# Patient Record
Sex: FEMALE | Race: WHITE | NOT HISPANIC OR LATINO | Employment: FULL TIME | URBAN - NONMETROPOLITAN AREA
[De-identification: names, ages, dates, MRNs, and addresses within clinical notes are randomized per-mention and may not be internally consistent; named-entity substitution may affect disease eponyms.]

---

## 2023-04-25 ENCOUNTER — CLINICAL SUPPORT (OUTPATIENT)
Dept: PRIMARY CARE | Facility: CLINIC | Age: 23
End: 2023-04-25
Payer: COMMERCIAL

## 2023-04-25 DIAGNOSIS — Z23 ENCOUNTER FOR IMMUNIZATION: ICD-10-CM

## 2023-04-25 DIAGNOSIS — Z11.1 SCREENING FOR TUBERCULOSIS: ICD-10-CM

## 2023-04-25 PROBLEM — L91.0 KELOID CICATRIX: Status: ACTIVE | Noted: 2023-04-25

## 2023-04-25 PROBLEM — N63.0 BREAST MASS: Status: ACTIVE | Noted: 2023-04-25

## 2023-04-25 PROBLEM — N63.24 BREAST LUMP ON LEFT SIDE AT 7 O'CLOCK POSITION: Status: ACTIVE | Noted: 2023-04-25

## 2023-04-25 PROCEDURE — 86580 TB INTRADERMAL TEST: CPT | Performed by: FAMILY MEDICINE

## 2023-04-25 NOTE — PROGRESS NOTES
INJECTED 0.5 ML OF TUBERSOL INTRADERMALLY INTO LT FOREARM, WHEAL WAS ACHIEVED. OT INSTRUCTED TO COME BACK IN 48-72 HOURS TO HAVE RESULTS READ.

## 2023-04-27 ENCOUNTER — CLINICAL SUPPORT (OUTPATIENT)
Dept: PRIMARY CARE | Facility: CLINIC | Age: 23
End: 2023-04-27
Payer: COMMERCIAL

## 2023-04-27 DIAGNOSIS — Z11.1 SCREENING FOR TUBERCULOSIS: ICD-10-CM

## 2023-04-27 LAB
INDURATION: 0 MM
TB SKIN TEST: NEGATIVE

## 2023-06-12 ENCOUNTER — CLINICAL SUPPORT (OUTPATIENT)
Dept: PRIMARY CARE | Facility: CLINIC | Age: 23
End: 2023-06-12
Payer: COMMERCIAL

## 2023-06-12 DIAGNOSIS — Z11.1 VISIT FOR TB SKIN TEST: ICD-10-CM

## 2023-06-12 PROCEDURE — 86580 TB INTRADERMAL TEST: CPT | Performed by: FAMILY MEDICINE

## 2023-06-12 NOTE — PROGRESS NOTES
Pt given TB skin test 1 of 2 in Lt forearm. Pt to return either Wednesday after 2pm or Thursday before 2pm

## 2023-06-15 ENCOUNTER — CLINICAL SUPPORT (OUTPATIENT)
Dept: PRIMARY CARE | Facility: CLINIC | Age: 23
End: 2023-06-15
Payer: COMMERCIAL

## 2023-06-19 ENCOUNTER — CLINICAL SUPPORT (OUTPATIENT)
Dept: PRIMARY CARE | Facility: CLINIC | Age: 23
End: 2023-06-19
Payer: COMMERCIAL

## 2023-06-19 DIAGNOSIS — Z11.1 VISIT FOR TB SKIN TEST: ICD-10-CM

## 2023-06-19 PROCEDURE — 86580 TB INTRADERMAL TEST: CPT | Performed by: FAMILY MEDICINE

## 2023-06-21 ENCOUNTER — CLINICAL SUPPORT (OUTPATIENT)
Dept: PRIMARY CARE | Facility: CLINIC | Age: 23
End: 2023-06-21
Payer: COMMERCIAL

## 2023-06-21 DIAGNOSIS — Z11.1 VISIT FOR TB SKIN TEST: ICD-10-CM

## 2023-06-21 LAB
INDURATION: 0 MM
INDURATION: 0 MM
TB SKIN TEST: NEGATIVE
TB SKIN TEST: NEGATIVE

## 2023-06-21 NOTE — PROGRESS NOTES
PT CAME IN FOR TB READ  0 MM INDURATION PRESENT ON LT FOREARM. PATIENT WAS GIVEN A COPY OF DOCUMENT FOR INJECTIONS

## 2023-10-06 ENCOUNTER — OFFICE VISIT (OUTPATIENT)
Dept: PRIMARY CARE | Facility: CLINIC | Age: 23
End: 2023-10-06
Payer: COMMERCIAL

## 2023-10-06 VITALS
SYSTOLIC BLOOD PRESSURE: 112 MMHG | BODY MASS INDEX: 19.38 KG/M2 | DIASTOLIC BLOOD PRESSURE: 64 MMHG | HEIGHT: 67 IN | HEART RATE: 80 BPM | WEIGHT: 123.5 LBS | OXYGEN SATURATION: 98 %

## 2023-10-06 DIAGNOSIS — Z00.00 ROUTINE GENERAL MEDICAL EXAMINATION AT A HEALTH CARE FACILITY: Primary | ICD-10-CM

## 2023-10-06 PROCEDURE — 99395 PREV VISIT EST AGE 18-39: CPT | Performed by: FAMILY MEDICINE

## 2023-10-06 PROCEDURE — 1036F TOBACCO NON-USER: CPT | Performed by: FAMILY MEDICINE

## 2023-10-06 ASSESSMENT — ENCOUNTER SYMPTOMS
COUGH: 0
PALPITATIONS: 0
FATIGUE: 0
FREQUENCY: 0
ARTHRALGIAS: 0
CONSTIPATION: 0
NERVOUS/ANXIOUS: 0
HEADACHES: 0
MYALGIAS: 0
NAUSEA: 0
SHORTNESS OF BREATH: 0
SLEEP DISTURBANCE: 0
DYSURIA: 0
DIARRHEA: 0

## 2023-10-06 ASSESSMENT — PATIENT HEALTH QUESTIONNAIRE - PHQ9
2. FEELING DOWN, DEPRESSED OR HOPELESS: NOT AT ALL
1. LITTLE INTEREST OR PLEASURE IN DOING THINGS: NOT AT ALL
SUM OF ALL RESPONSES TO PHQ9 QUESTIONS 1 AND 2: 0

## 2023-10-06 NOTE — PROGRESS NOTES
"Subjective   Patient ID: Mague Rodriguez is a 23 y.o. female who presents for Annual Exam.    HPI   Since last year she has been feeling great, no problems.  Labs last year were excellent including an HDL and LDL that were both 71.  She did have troubles with a lump in her breast.  Dr. Beach will follow that.  Last ultrasound was done in March 2022 and was normal.  Unless there are any symptoms no follow-up needed.  She has not had any in either breast or pain in either breast.    From done.  No need for labs this year.    Review of Systems   Constitutional:  Negative for fatigue.   HENT:  Negative for hearing loss.    Eyes:  Negative for visual disturbance.   Respiratory:  Negative for cough and shortness of breath.    Cardiovascular:  Negative for chest pain and palpitations.   Gastrointestinal:  Negative for constipation, diarrhea and nausea.   Genitourinary:  Negative for dysuria and frequency.   Musculoskeletal:  Negative for arthralgias and myalgias.   Skin:  Negative for rash.   Neurological:  Negative for headaches.   Psychiatric/Behavioral:  Negative for sleep disturbance. The patient is not nervous/anxious.        Objective   /64   Pulse 80   Ht 1.702 m (5' 7\")   Wt 56 kg (123 lb 8 oz)   LMP 10/05/2023   SpO2 98%   BMI 19.34 kg/m²     Physical Exam  Constitutional:       Appearance: Normal appearance.   HENT:      Head: Normocephalic and atraumatic.   Cardiovascular:      Rate and Rhythm: Normal rate and regular rhythm.      Heart sounds: Normal heart sounds.   Pulmonary:      Effort: Pulmonary effort is normal.      Breath sounds: Normal breath sounds.   Skin:     General: Skin is warm and dry.   Neurological:      General: No focal deficit present.      Mental Status: She is alert and oriented to person, place, and time.   Psychiatric:         Mood and Affect: Mood normal.         Behavior: Behavior normal.         Thought Content: Thought content normal.         Judgment: Judgment normal. "         Assessment/Plan   Problem List Items Addressed This Visit    None  Visit Diagnoses         Codes    Routine general medical examination at a health care facility    -  Primary Z00.00

## 2024-05-06 ENCOUNTER — HOSPITAL ENCOUNTER (OUTPATIENT)
Dept: RADIOLOGY | Facility: CLINIC | Age: 24
Discharge: HOME | End: 2024-05-06
Payer: COMMERCIAL

## 2024-05-06 ENCOUNTER — OFFICE VISIT (OUTPATIENT)
Dept: URGENT CARE | Facility: CLINIC | Age: 24
End: 2024-05-06
Payer: COMMERCIAL

## 2024-05-06 VITALS
BODY MASS INDEX: 18.05 KG/M2 | DIASTOLIC BLOOD PRESSURE: 78 MMHG | SYSTOLIC BLOOD PRESSURE: 109 MMHG | WEIGHT: 115 LBS | HEIGHT: 67 IN | OXYGEN SATURATION: 100 % | RESPIRATION RATE: 18 BRPM | HEART RATE: 67 BPM

## 2024-05-06 DIAGNOSIS — S99.921A INJURY OF RIGHT FOOT, INITIAL ENCOUNTER: Primary | ICD-10-CM

## 2024-05-06 DIAGNOSIS — S99.921A INJURY OF RIGHT FOOT, INITIAL ENCOUNTER: ICD-10-CM

## 2024-05-06 PROCEDURE — 73630 X-RAY EXAM OF FOOT: CPT | Mod: RIGHT SIDE | Performed by: RADIOLOGY

## 2024-05-06 PROCEDURE — 73630 X-RAY EXAM OF FOOT: CPT | Mod: RT

## 2024-05-06 PROCEDURE — 99214 OFFICE O/P EST MOD 30 MIN: CPT
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
## 2025-02-03 ENCOUNTER — APPOINTMENT (OUTPATIENT)
Age: 25
End: 2025-02-03
Payer: COMMERCIAL

## 2025-02-03 VITALS
HEART RATE: 106 BPM | SYSTOLIC BLOOD PRESSURE: 98 MMHG | DIASTOLIC BLOOD PRESSURE: 62 MMHG | BODY MASS INDEX: 17.39 KG/M2 | OXYGEN SATURATION: 98 % | WEIGHT: 110.8 LBS | HEIGHT: 67 IN

## 2025-02-03 DIAGNOSIS — F41.1 GENERALIZED ANXIETY DISORDER: ICD-10-CM

## 2025-02-03 DIAGNOSIS — G44.89 OTHER HEADACHE SYNDROME: ICD-10-CM

## 2025-02-03 DIAGNOSIS — R53.83 FATIGUE, UNSPECIFIED TYPE: Primary | ICD-10-CM

## 2025-02-03 PROBLEM — L91.0 KELOID CICATRIX: Status: RESOLVED | Noted: 2023-04-25 | Resolved: 2025-02-03

## 2025-02-03 PROBLEM — N63.0 BREAST MASS: Status: RESOLVED | Noted: 2023-04-25 | Resolved: 2025-02-03

## 2025-02-03 PROBLEM — N63.24 BREAST LUMP ON LEFT SIDE AT 7 O'CLOCK POSITION: Status: RESOLVED | Noted: 2023-04-25 | Resolved: 2025-02-03

## 2025-02-03 PROCEDURE — 1036F TOBACCO NON-USER: CPT | Performed by: FAMILY MEDICINE

## 2025-02-03 PROCEDURE — 3008F BODY MASS INDEX DOCD: CPT | Performed by: FAMILY MEDICINE

## 2025-02-03 PROCEDURE — 99214 OFFICE O/P EST MOD 30 MIN: CPT | Performed by: FAMILY MEDICINE

## 2025-02-03 RX ORDER — FLUOXETINE 10 MG/1
10 CAPSULE ORAL DAILY
Qty: 90 CAPSULE | Refills: 3 | Status: SHIPPED | OUTPATIENT
Start: 2025-02-03 | End: 2026-02-03

## 2025-02-03 RX ORDER — NAPROXEN 250 MG/1
250 TABLET ORAL AS NEEDED
COMMUNITY

## 2025-02-03 RX ORDER — MELATONIN 3 MG
3 CAPSULE ORAL NIGHTLY PRN
COMMUNITY

## 2025-02-03 ASSESSMENT — ENCOUNTER SYMPTOMS
COUGH: 0
MYALGIAS: 0
SLEEP DISTURBANCE: 0
FATIGUE: 1
POLYDIPSIA: 0
HEADACHES: 1
NAUSEA: 0
DIARRHEA: 0
LIGHT-HEADEDNESS: 1
SHORTNESS OF BREATH: 0
NUMBNESS: 0
ARTHRALGIAS: 0
DIZZINESS: 0
PALPITATIONS: 0
CONSTIPATION: 0
NERVOUS/ANXIOUS: 1

## 2025-02-03 ASSESSMENT — PATIENT HEALTH QUESTIONNAIRE - PHQ9
SUM OF ALL RESPONSES TO PHQ9 QUESTIONS 1 & 2: 0
2. FEELING DOWN, DEPRESSED OR HOPELESS: NOT AT ALL
1. LITTLE INTEREST OR PLEASURE IN DOING THINGS: NOT AT ALL

## 2025-02-03 NOTE — PROGRESS NOTES
Subjective   Patient ID: Mague Rodriguez is a 24 y.o. female who presents for Follow-up (Headache behind eyes, daily x every day).    HPI   Getting frontal headaches over the sinus area off-and-on for the last 1 to 2 months.  No nausea photophobia or phonophobia.  Feels like a stabbing pain.  Sometimes she will feel like she has a vice or bandlike sensation around her head.  No throbbing or pounding.  No aura.  No past medical history or family history of migraines.  Naprosyn does help, Excedrin to help with the caffeine interfered with her sleep greatly.  She has not been taking the Naprosyn every day but she has been most of the days of the week.    Little bit of heat intolerance some anxiety sleep issues were off and on sometimes she sleeps too much sometimes not enough.  But no trouble falling asleep no racing thoughts.    Definitely has been having more anxiety over the last 3 months.  Had in the past but never this bad.  Periods have been okay.  For anxiety in the past was on fluoxetine and trazodone.  Fluoxetine helped without any problems did not like the trazodone.    Stop melatonin and Naprosyn  Basic labs plus magnesium and B12  Restart fluoxetine 10 mg daily.  Effexor can be back up.  Office visit 3 months    Review of Systems   Constitutional:  Positive for fatigue.   HENT:  Negative for hearing loss.    Eyes:  Negative for visual disturbance.   Respiratory:  Negative for cough and shortness of breath.    Cardiovascular:  Negative for chest pain and palpitations.   Gastrointestinal:  Negative for constipation, diarrhea and nausea.   Endocrine: Positive for heat intolerance. Negative for cold intolerance and polydipsia.   Musculoskeletal:  Negative for arthralgias and myalgias.   Skin:  Negative for rash.   Neurological:  Positive for light-headedness and headaches. Negative for dizziness and numbness.   Psychiatric/Behavioral:  Negative for sleep disturbance. The patient is nervous/anxious.   "      Objective   BP 98/62   Pulse 106   Ht 1.702 m (5' 7\")   Wt 50.3 kg (110 lb 12.8 oz)   LMP 01/06/2025   SpO2 98%   BMI 17.35 kg/m²     Physical Exam  Constitutional:       Appearance: Normal appearance.   HENT:      Head: Normocephalic and atraumatic.   Neck:      Thyroid: No thyroid mass, thyromegaly or thyroid tenderness.   Cardiovascular:      Rate and Rhythm: Normal rate and regular rhythm.      Heart sounds: Normal heart sounds.   Pulmonary:      Effort: Pulmonary effort is normal.      Breath sounds: Normal breath sounds.   Lymphadenopathy:      Cervical: No cervical adenopathy.   Skin:     General: Skin is warm and dry.   Neurological:      General: No focal deficit present.      Mental Status: She is alert and oriented to person, place, and time.   Psychiatric:         Mood and Affect: Mood normal.         Behavior: Behavior normal.         Thought Content: Thought content normal.         Judgment: Judgment normal.         Assessment/Plan   Problem List Items Addressed This Visit             ICD-10-CM    Other headache syndrome G44.89    Relevant Orders    CBC    Comprehensive Metabolic Panel    Magnesium    Vitamin B12    Thyroid Stimulating Hormone     Other Visit Diagnoses         Codes    Fatigue, unspecified type    -  Primary R53.83    Relevant Orders    CBC    Comprehensive Metabolic Panel    Magnesium    Vitamin B12    Thyroid Stimulating Hormone    Generalized anxiety disorder     F41.1    Relevant Medications    FLUoxetine (PROzac) 10 mg capsule               "

## 2025-02-04 LAB
ALBUMIN SERPL-MCNC: 5 G/DL (ref 3.6–5.1)
ALP SERPL-CCNC: 49 U/L (ref 31–125)
ALT SERPL-CCNC: 12 U/L (ref 6–29)
ANION GAP SERPL CALCULATED.4IONS-SCNC: 7 MMOL/L (CALC) (ref 7–17)
AST SERPL-CCNC: 18 U/L (ref 10–30)
BILIRUB SERPL-MCNC: 0.6 MG/DL (ref 0.2–1.2)
BUN SERPL-MCNC: 14 MG/DL (ref 7–25)
CALCIUM SERPL-MCNC: 9.4 MG/DL (ref 8.6–10.2)
CHLORIDE SERPL-SCNC: 106 MMOL/L (ref 98–110)
CO2 SERPL-SCNC: 27 MMOL/L (ref 20–32)
CREAT SERPL-MCNC: 0.75 MG/DL (ref 0.5–0.96)
EGFRCR SERPLBLD CKD-EPI 2021: 114 ML/MIN/1.73M2
ERYTHROCYTE [DISTWIDTH] IN BLOOD BY AUTOMATED COUNT: 11.8 % (ref 11–15)
GLUCOSE SERPL-MCNC: 87 MG/DL (ref 65–99)
HCT VFR BLD AUTO: 43.2 % (ref 35–45)
HGB BLD-MCNC: 13.9 G/DL (ref 11.7–15.5)
MAGNESIUM SERPL-MCNC: 2.2 MG/DL (ref 1.5–2.5)
MCH RBC QN AUTO: 28.5 PG (ref 27–33)
MCHC RBC AUTO-ENTMCNC: 32.2 G/DL (ref 32–36)
MCV RBC AUTO: 88.7 FL (ref 80–100)
PLATELET # BLD AUTO: 324 THOUSAND/UL (ref 140–400)
PMV BLD REES-ECKER: 9.5 FL (ref 7.5–12.5)
POTASSIUM SERPL-SCNC: 4.2 MMOL/L (ref 3.5–5.3)
PROT SERPL-MCNC: 7.5 G/DL (ref 6.1–8.1)
RBC # BLD AUTO: 4.87 MILLION/UL (ref 3.8–5.1)
SODIUM SERPL-SCNC: 140 MMOL/L (ref 135–146)
TSH SERPL-ACNC: 1.86 MIU/L
VIT B12 SERPL-MCNC: 258 PG/ML (ref 200–1100)
WBC # BLD AUTO: 6.8 THOUSAND/UL (ref 3.8–10.8)

## 2025-02-10 ENCOUNTER — TELEPHONE (OUTPATIENT)
Dept: OBSTETRICS AND GYNECOLOGY | Facility: CLINIC | Age: 25
End: 2025-02-10
Payer: COMMERCIAL

## 2025-02-10 ENCOUNTER — APPOINTMENT (OUTPATIENT)
Dept: RADIOLOGY | Facility: HOSPITAL | Age: 25
End: 2025-02-10
Payer: COMMERCIAL

## 2025-02-10 ENCOUNTER — HOSPITAL ENCOUNTER (EMERGENCY)
Facility: HOSPITAL | Age: 25
Discharge: HOME | End: 2025-02-10
Attending: EMERGENCY MEDICINE
Payer: COMMERCIAL

## 2025-02-10 VITALS
SYSTOLIC BLOOD PRESSURE: 102 MMHG | OXYGEN SATURATION: 97 % | BODY MASS INDEX: 17.27 KG/M2 | RESPIRATION RATE: 16 BRPM | WEIGHT: 110 LBS | HEIGHT: 67 IN | DIASTOLIC BLOOD PRESSURE: 77 MMHG | TEMPERATURE: 98 F | HEART RATE: 77 BPM

## 2025-02-10 DIAGNOSIS — N83.202 CYST OF LEFT OVARY: Primary | ICD-10-CM

## 2025-02-10 DIAGNOSIS — N83.202 LEFT OVARIAN CYST: Primary | ICD-10-CM

## 2025-02-10 LAB
AMORPH CRY #/AREA UR COMP ASSIST: ABNORMAL /HPF
ANION GAP SERPL CALC-SCNC: 11 MMOL/L (ref 10–20)
APPEARANCE UR: ABNORMAL
B-HCG SERPL-ACNC: <2 MIU/ML
BASOPHILS # BLD AUTO: 0.06 X10*3/UL (ref 0–0.1)
BASOPHILS NFR BLD AUTO: 0.4 %
BILIRUB UR STRIP.AUTO-MCNC: NEGATIVE MG/DL
BUN SERPL-MCNC: 12 MG/DL (ref 6–23)
CALCIUM SERPL-MCNC: 8.9 MG/DL (ref 8.6–10.3)
CAOX CRY #/AREA UR COMP ASSIST: ABNORMAL /HPF
CHLORIDE SERPL-SCNC: 106 MMOL/L (ref 98–107)
CO2 SERPL-SCNC: 24 MMOL/L (ref 21–32)
COLOR UR: YELLOW
CREAT SERPL-MCNC: 0.85 MG/DL (ref 0.5–1.05)
EGFRCR SERPLBLD CKD-EPI 2021: >90 ML/MIN/1.73M*2
EOSINOPHIL # BLD AUTO: 0.16 X10*3/UL (ref 0–0.7)
EOSINOPHIL NFR BLD AUTO: 1.1 %
ERYTHROCYTE [DISTWIDTH] IN BLOOD BY AUTOMATED COUNT: 11.9 % (ref 11.5–14.5)
GLUCOSE SERPL-MCNC: 101 MG/DL (ref 74–99)
GLUCOSE UR STRIP.AUTO-MCNC: NORMAL MG/DL
HCT VFR BLD AUTO: 39.6 % (ref 36–46)
HGB BLD-MCNC: 13.4 G/DL (ref 12–16)
IMM GRANULOCYTES # BLD AUTO: 0.03 X10*3/UL (ref 0–0.7)
IMM GRANULOCYTES NFR BLD AUTO: 0.2 % (ref 0–0.9)
KETONES UR STRIP.AUTO-MCNC: NEGATIVE MG/DL
LEUKOCYTE ESTERASE UR QL STRIP.AUTO: NEGATIVE
LYMPHOCYTES # BLD AUTO: 1.69 X10*3/UL (ref 1.2–4.8)
LYMPHOCYTES NFR BLD AUTO: 11.9 %
MCH RBC QN AUTO: 29.3 PG (ref 26–34)
MCHC RBC AUTO-ENTMCNC: 33.8 G/DL (ref 32–36)
MCV RBC AUTO: 87 FL (ref 80–100)
MONOCYTES # BLD AUTO: 0.72 X10*3/UL (ref 0.1–1)
MONOCYTES NFR BLD AUTO: 5.1 %
MUCOUS THREADS #/AREA URNS AUTO: ABNORMAL /LPF
NEUTROPHILS # BLD AUTO: 11.54 X10*3/UL (ref 1.2–7.7)
NEUTROPHILS NFR BLD AUTO: 81.3 %
NITRITE UR QL STRIP.AUTO: NEGATIVE
NRBC BLD-RTO: 0 /100 WBCS (ref 0–0)
PH UR STRIP.AUTO: 5.5 [PH]
PLATELET # BLD AUTO: 277 X10*3/UL (ref 150–450)
POTASSIUM SERPL-SCNC: 3.6 MMOL/L (ref 3.5–5.3)
PROT UR STRIP.AUTO-MCNC: ABNORMAL MG/DL
RBC # BLD AUTO: 4.57 X10*6/UL (ref 4–5.2)
RBC # UR STRIP.AUTO: NEGATIVE MG/DL
RBC #/AREA URNS AUTO: ABNORMAL /HPF
SODIUM SERPL-SCNC: 137 MMOL/L (ref 136–145)
SP GR UR STRIP.AUTO: 1.02
SQUAMOUS #/AREA URNS AUTO: ABNORMAL /HPF
UROBILINOGEN UR STRIP.AUTO-MCNC: ABNORMAL MG/DL
WBC # BLD AUTO: 14.2 X10*3/UL (ref 4.4–11.3)
WBC #/AREA URNS AUTO: ABNORMAL /HPF

## 2025-02-10 PROCEDURE — 74177 CT ABD & PELVIS W/CONTRAST: CPT | Performed by: RADIOLOGY

## 2025-02-10 PROCEDURE — 74177 CT ABD & PELVIS W/CONTRAST: CPT

## 2025-02-10 PROCEDURE — 85025 COMPLETE CBC W/AUTO DIFF WBC: CPT | Performed by: EMERGENCY MEDICINE

## 2025-02-10 PROCEDURE — 84702 CHORIONIC GONADOTROPIN TEST: CPT | Performed by: EMERGENCY MEDICINE

## 2025-02-10 PROCEDURE — 81003 URINALYSIS AUTO W/O SCOPE: CPT | Performed by: EMERGENCY MEDICINE

## 2025-02-10 PROCEDURE — 80048 BASIC METABOLIC PNL TOTAL CA: CPT | Performed by: EMERGENCY MEDICINE

## 2025-02-10 PROCEDURE — 36415 COLL VENOUS BLD VENIPUNCTURE: CPT | Performed by: EMERGENCY MEDICINE

## 2025-02-10 PROCEDURE — 2550000001 HC RX 255 CONTRASTS: Performed by: EMERGENCY MEDICINE

## 2025-02-10 PROCEDURE — 99285 EMERGENCY DEPT VISIT HI MDM: CPT | Mod: 25 | Performed by: EMERGENCY MEDICINE

## 2025-02-10 RX ORDER — HYDROCODONE BITARTRATE AND ACETAMINOPHEN 5; 325 MG/1; MG/1
1 TABLET ORAL EVERY 6 HOURS PRN
Qty: 12 TABLET | Refills: 0 | Status: SHIPPED | OUTPATIENT
Start: 2025-02-10 | End: 2025-02-13

## 2025-02-10 RX ADMIN — IOHEXOL 66 ML: 350 INJECTION, SOLUTION INTRAVENOUS at 07:11

## 2025-02-10 ASSESSMENT — PAIN DESCRIPTION - PAIN TYPE: TYPE: ACUTE PAIN

## 2025-02-10 ASSESSMENT — PAIN DESCRIPTION - LOCATION: LOCATION: ABDOMEN

## 2025-02-10 ASSESSMENT — PAIN - FUNCTIONAL ASSESSMENT: PAIN_FUNCTIONAL_ASSESSMENT: 0-10

## 2025-02-10 ASSESSMENT — COLUMBIA-SUICIDE SEVERITY RATING SCALE - C-SSRS
1. IN THE PAST MONTH, HAVE YOU WISHED YOU WERE DEAD OR WISHED YOU COULD GO TO SLEEP AND NOT WAKE UP?: NO
6. HAVE YOU EVER DONE ANYTHING, STARTED TO DO ANYTHING, OR PREPARED TO DO ANYTHING TO END YOUR LIFE?: NO
2. HAVE YOU ACTUALLY HAD ANY THOUGHTS OF KILLING YOURSELF?: NO

## 2025-02-10 ASSESSMENT — PAIN SCALES - GENERAL: PAINLEVEL_OUTOF10: 4

## 2025-02-10 ASSESSMENT — PAIN DESCRIPTION - ORIENTATION: ORIENTATION: LOWER

## 2025-02-10 NOTE — ED PROVIDER NOTES
Medical Decision Making  Patient care was taken over by myself at 7 AM.  Patient was awaiting CT scan of the abdomen pelvis.  Lab work did reveal a leukocytosis with a white cell count of 14.2.  Urinalysis was contaminated without obvious evidence of infection.  The the CT scan of abdomen pelvis revealed a 7 cm cystic mass in the left adnexa with surrounding free fluid in the pelvis.  Ultrasound recommended for further evaluation.  A 6 cm calcification along the right aspect of the bladder base.  Distal ureter difficult to follow.  No hydronephrosis.  Likely relates to a phlebolith.  I did have a discussion with the patient and the patient's mother concerning CT scan results and plan of care.  I told her that I would be providing her with a prescription for narcotic pain medication mother was asking if this would help her pain because it was so severe before I told her that I am not sure everybody is different with respect to how they handle pain medication she also has what else I could have the patient take and recommended anti-inflammatories such as ibuprofen.  Patient apparently was told by her primary care doctor not to take anti-inflammatories because it leads to headaches.  I said the only other medication he can take then is Tylenol over-the-counter in addition to the narcotic.  The patient will be referred to Dr. Donovan for follow-up concerning this apparently she has had ovarian cyst before but never followed up with specialist previously.         Emergency Medicine Transition of Care Note.    I received Mague Rodriguez in signout from Dr. Davenport.  Please see the previous ED provider note for all HPI, PE and MDM up to the time of signout at 0700. This is in addition to the primary record.    In brief Mague Rodriguez is an 24 y.o. female presenting for   Chief Complaint   Patient presents with    Abdominal Pain     Reports waking up this morning with 10/10 suprapubic pain, that is slowly subsiding. Denies  NVD and urinary symptoms.     At the time of signout we were awaiting: CT scan results          Final diagnoses:   None           Procedure  Procedures    DO Conrado Eng DO  02/13/25 4007

## 2025-02-10 NOTE — TELEPHONE ENCOUNTER
Pt called in was just seen at the ER for abdominal pain and a cyst on her left ovary. Can you review her chart and let me know if you would like for her to be seen sooner.     I have her scheduled on 02/19/25, you have seen her before, but its been over 5 years so she would be considered new.     Thank you

## 2025-02-10 NOTE — ED PROVIDER NOTES
HPI   Chief Complaint   Patient presents with    Abdominal Pain     Reports waking up this morning with 10/10 suprapubic pain, that is slowly subsiding. Denies NVD and urinary symptoms.       A 24-year-old female presents with suprapubic pain.  Patient states symptoms started yesterday.  Patient denies any nausea, vomiting or diarrhea.  Patient denies any vaginal discharge.  Patient is unsure of pregnancy status.  Patient does give a history of a ruptured ovarian cyst.      History provided by:  Patient          Patient History   Past Medical History:   Diagnosis Date    Other conditions influencing health status     Menstruation     Past Surgical History:   Procedure Laterality Date    OTHER SURGICAL HISTORY  11/08/2019    Adenoidectomy    OTHER SURGICAL HISTORY  10/27/2022    Lumpectomy     Family History   Problem Relation Name Age of Onset    No Known Problems Mother      No Known Problems Father      Breast cancer Maternal Grandmother      Breast cancer Paternal Grandmother       Social History     Tobacco Use    Smoking status: Never    Smokeless tobacco: Never   Substance Use Topics    Alcohol use: Yes    Drug use: Never       Physical Exam   ED Triage Vitals [02/10/25 0524]   Temperature Heart Rate Respirations BP   36.7 °C (98 °F) 94 16 (!) 139/106      Pulse Ox Temp Source Heart Rate Source Patient Position   99 % Temporal Monitor --      BP Location FiO2 (%)     -- --       Physical Exam  Vitals and nursing note reviewed.   Constitutional:       General: She is not in acute distress.     Appearance: She is well-developed.   HENT:      Head: Normocephalic and atraumatic.   Eyes:      Conjunctiva/sclera: Conjunctivae normal.   Cardiovascular:      Rate and Rhythm: Normal rate and regular rhythm.      Heart sounds: No murmur heard.  Pulmonary:      Effort: Pulmonary effort is normal. No respiratory distress.      Breath sounds: Normal breath sounds.   Abdominal:      Palpations: Abdomen is soft.       Tenderness: There is no abdominal tenderness.   Musculoskeletal:         General: No swelling.      Cervical back: Neck supple.   Skin:     General: Skin is warm and dry.      Capillary Refill: Capillary refill takes less than 2 seconds.   Neurological:      Mental Status: She is alert.   Psychiatric:         Mood and Affect: Mood normal.       Labs Reviewed   CBC WITH AUTO DIFFERENTIAL - Abnormal       Result Value    WBC 14.2 (*)     nRBC 0.0      RBC 4.57      Hemoglobin 13.4      Hematocrit 39.6      MCV 87      MCH 29.3      MCHC 33.8      RDW 11.9      Platelets 277      Neutrophils % 81.3      Immature Granulocytes %, Automated 0.2      Lymphocytes % 11.9      Monocytes % 5.1      Eosinophils % 1.1      Basophils % 0.4      Neutrophils Absolute 11.54 (*)     Immature Granulocytes Absolute, Automated 0.03      Lymphocytes Absolute 1.69      Monocytes Absolute 0.72      Eosinophils Absolute 0.16      Basophils Absolute 0.06     BASIC METABOLIC PANEL - Abnormal    Glucose 101 (*)     Sodium 137      Potassium 3.6      Chloride 106      Bicarbonate 24      Anion Gap 11      Urea Nitrogen 12      Creatinine 0.85      eGFR >90      Calcium 8.9     URINALYSIS WITH REFLEX CULTURE AND MICROSCOPIC - Abnormal    Color, Urine Yellow      Appearance, Urine Turbid (*)     Specific Gravity, Urine 1.024      pH, Urine 5.5      Protein, Urine 10 (TRACE)      Glucose, Urine Normal      Blood, Urine NEGATIVE      Ketones, Urine NEGATIVE      Bilirubin, Urine NEGATIVE      Urobilinogen, Urine 4 (2+) (*)     Nitrite, Urine NEGATIVE      Leukocyte Esterase, Urine NEGATIVE     URINALYSIS MICROSCOPIC WITH REFLEX CULTURE - Abnormal    WBC, Urine NONE      RBC, Urine 1-2      Squamous Epithelial Cells, Urine 10-25 (FEW)      Mucus, Urine 2+      Calcium Oxalate Crystals, Urine 4+ (*)     Amorphous Crystals, Urine 1+     HUMAN CHORIONIC GONADOTROPIN, SERUM QUANTITATIVE - Normal    HCG, Beta-Quantitative <2      Narrative:      Total  HCG measurement is performed using the [employer] Access   Immunoassay which detects intact HCG and free beta HCG subunit.    This test is not indicated for use as a tumor marker.   HCG testing is performed using a different test methodology at [facility] than other [facility]. Direct result comparison   should only be made within the same method.       URINALYSIS WITH REFLEX CULTURE AND MICROSCOPIC    Narrative:     The following orders were created for panel order Urinalysis with Reflex Culture and Microscopic.  Procedure                               Abnormality         Status                     ---------                               -----------         ------                     Urinalysis with Reflex C...[[id number]]  Abnormal            Final result               Extra Urine Gray Tube[[id number]]                                                         Please view results for these tests on the individual orders.   EXTRA URINE GRAY TUBE      ED Course & MDM                  No data recorded     Lucille Coma Scale Score: 15 (02/10/25 0525 : Julio Cesar Caraballo RN)                           Medical Decision Making      Procedure  Procedures

## 2025-02-11 ENCOUNTER — HOSPITAL ENCOUNTER (OUTPATIENT)
Dept: RADIOLOGY | Facility: CLINIC | Age: 25
Discharge: HOME | End: 2025-02-11
Payer: COMMERCIAL

## 2025-02-11 DIAGNOSIS — N83.202 LEFT OVARIAN CYST: ICD-10-CM

## 2025-02-11 PROCEDURE — 76856 US EXAM PELVIC COMPLETE: CPT | Performed by: RADIOLOGY

## 2025-02-11 PROCEDURE — 76830 TRANSVAGINAL US NON-OB: CPT | Performed by: RADIOLOGY

## 2025-02-11 PROCEDURE — 76856 US EXAM PELVIC COMPLETE: CPT

## 2025-02-19 ENCOUNTER — APPOINTMENT (OUTPATIENT)
Dept: OBSTETRICS AND GYNECOLOGY | Facility: CLINIC | Age: 25
End: 2025-02-19
Payer: COMMERCIAL

## 2025-02-19 VITALS
DIASTOLIC BLOOD PRESSURE: 62 MMHG | HEIGHT: 67 IN | BODY MASS INDEX: 17.36 KG/M2 | SYSTOLIC BLOOD PRESSURE: 112 MMHG | WEIGHT: 110.6 LBS

## 2025-02-19 DIAGNOSIS — N83.202 LEFT OVARIAN CYST: Primary | ICD-10-CM

## 2025-02-19 PROCEDURE — 1036F TOBACCO NON-USER: CPT | Performed by: OBSTETRICS & GYNECOLOGY

## 2025-02-19 PROCEDURE — 99203 OFFICE O/P NEW LOW 30 MIN: CPT | Performed by: OBSTETRICS & GYNECOLOGY

## 2025-02-19 PROCEDURE — 3008F BODY MASS INDEX DOCD: CPT | Performed by: OBSTETRICS & GYNECOLOGY

## 2025-02-19 SDOH — ECONOMIC STABILITY: TRANSPORTATION INSECURITY
IN THE PAST 12 MONTHS, HAS LACK OF TRANSPORTATION KEPT YOU FROM MEETINGS, WORK, OR FROM GETTING THINGS NEEDED FOR DAILY LIVING?: NO

## 2025-02-19 SDOH — ECONOMIC STABILITY: FOOD INSECURITY: WITHIN THE PAST 12 MONTHS, YOU WORRIED THAT YOUR FOOD WOULD RUN OUT BEFORE YOU GOT MONEY TO BUY MORE.: NEVER TRUE

## 2025-02-19 SDOH — ECONOMIC STABILITY: FOOD INSECURITY: WITHIN THE PAST 12 MONTHS, THE FOOD YOU BOUGHT JUST DIDN'T LAST AND YOU DIDN'T HAVE MONEY TO GET MORE.: NEVER TRUE

## 2025-02-19 SDOH — ECONOMIC STABILITY: INCOME INSECURITY: IN THE LAST 12 MONTHS, WAS THERE A TIME WHEN YOU WERE NOT ABLE TO PAY THE MORTGAGE OR RENT ON TIME?: NO

## 2025-02-19 SDOH — ECONOMIC STABILITY: TRANSPORTATION INSECURITY
IN THE PAST 12 MONTHS, HAS THE LACK OF TRANSPORTATION KEPT YOU FROM MEDICAL APPOINTMENTS OR FROM GETTING MEDICATIONS?: NO

## 2025-02-19 ASSESSMENT — LIFESTYLE VARIABLES
AUDIT-C TOTAL SCORE: 1
SKIP TO QUESTIONS 9-10: 1
HOW OFTEN DO YOU HAVE SIX OR MORE DRINKS ON ONE OCCASION: NEVER
HOW MANY STANDARD DRINKS CONTAINING ALCOHOL DO YOU HAVE ON A TYPICAL DAY: 1 OR 2
HOW OFTEN DO YOU HAVE A DRINK CONTAINING ALCOHOL: MONTHLY OR LESS

## 2025-02-19 ASSESSMENT — PATIENT HEALTH QUESTIONNAIRE - PHQ9
SUM OF ALL RESPONSES TO PHQ9 QUESTIONS 1 AND 2: 0
2. FEELING DOWN, DEPRESSED OR HOPELESS: NOT AT ALL
1. LITTLE INTEREST OR PLEASURE IN DOING THINGS: NOT AT ALL

## 2025-02-19 NOTE — PROGRESS NOTES
Mague Rodriguez is a 24 y.o. year old female patient.  PCP = Kelton Dillard MD    Chief Complaint   Patient presents with    ER Follow-up     New patient here with her mom for ER follow up. Patient had CT and ultrasound done.       HPI   Presents accompanied by her mother regarding recent ER visit.  CT scan performed through the emergency room showed a left ovarian cyst.  She then had a follow-up pelvic ultrasound showing a 5.7 cm left ovarian cyst most likely representing a hemorrhagic cyst.  She denies any bowel or bladder problems.  Denies any breast problems.  She is on no form of contraception at this time.    OB History          0    Para   0    Term   0       0    AB   0    Living   0         SAB   0    IAB   0    Ectopic   0    Multiple   0    Live Births   0                 Past Medical History:   Diagnosis Date    Other conditions influencing health status     Menstruation       Past Surgical History:   Procedure Laterality Date    BREAST BIOPSY Left 2020    TYMPANOSTOMY TUBE PLACEMENT         Review of Systems:   Constitutional: No fever or chills  Respiratory: No shortness of breath, or cough  Cardiovascular: No chest pain or syncope  Breasts: No breast pain, no masses, no nipple discharge  Gastrointestinal: No nausea, vomiting, or diarrhea, no abdominal pain  Genitourinary: No dysuria or frequency  Gynecology: Negative except as noted in history of present illness  All other: All other systems reviewed and negative for complaint    Medication Documentation Review Audit       Reviewed by Sriram Donovan MD (Physician) on 25 at 0805      Medication Order Taking? Sig Documenting Provider Last Dose Status   FLUoxetine (PROzac) 10 mg capsule [id number]  Take 1 capsule (10 mg) by mouth once daily. Kelton Dillard MD  Active   HYDROcodone-acetaminophen (Norco) 5-325 mg tablet [id number]  Take 1 tablet by mouth every 6 hours if needed for severe pain (7 - 10) for up to 3 days.  "Conrado Cuevas, DO   25 2359   melatonin 3 mg capsule [id number]  Take 3 mg by mouth as needed at bedtime (sleep). Historical Provider, MD  Active   naproxen (Naprosyn) 250 mg tablet [id number]  Take 1 tablet (250 mg) by mouth if needed for mild pain (1 - 3) (for headaches). Historical Provider, MD  Active                     /62   Ht 1.702 m (5' 7\")   Wt 50.2 kg (110 lb 9.6 oz)   LMP 2025 (Exact Date)   BMI 17.32 kg/m²     PHYSICAL EXAMINATION:  General: No acute distress  Eye: Intraocular movements are intact  HEENT: Normocephalic  Respiratory: Respirations are nonlabored  Gastrointestinal: Nondistended   Musculoskeletal: Normal range of motion  Neurologic: Alert and oriented x3  Psychiatric: Cooperative, appropriate mood and affect.    Interpreted By:  Jose Zambrano,   STUDY:  US PELVIS TRANSABDOMINAL WITH TRANSVAGINAL;  2025 1:28 pm      INDICATION:  Signs/Symptoms:Left ovarian cyst on CT scan.      ,N83.202 Unspecified ovarian cyst, left side      COMPARISON:  CT abdomen/pelvis of 02/10/2025.      ACCESSION NUMBER(S):  [id number]      ORDERING CLINICIAN:  KATE HAWKINS      TECHNIQUE:  Multiple multiplanar static gray scale, color and spectral waveform  sonographic images of the pelvis were obtained.  Transabdominal and  endovaginal ultrasound was performed.      FINDINGS:  UTERUS:  Uterus is normal in size with a smooth external contour measuring  7.5 x 3.5 x 5.4 cm.  No discrete myometrial lesion is identified.      ENDOMETRIUM:  Endometrial complex is  homogeneous in echotexture with thickness of  6 mm.      RIGHT ADNEXA:  Right ovary is normal in size containing small physiologic  cysts/follicles. Right ovary measures  4.2 x 2 x 2.2 cm. Blood flow  can be seen to the right ovary.   No additional adnexal mass or fluid  collection is seen.      LEFT ADNEXA:  Left ovary is enlarged measuring 6.9 x 4.5 x 6.2 cm. There is a large  ovoid intermediate echogenicity masslike " lesion of the left ovary  measuring 5.7 x 4.1 x 5.6 cm without demonstrated intrinsic blood  flow. Blood flow is demonstrated to the left ovary.      CUL DE SAC:  There is intrapelvic free fluid with low level intrinsic echoes which  may related to debris or blood product.      IMPRESSION:  1.  Enlarged left ovary containing a 5.7 x 4.1 x 5.6 cm intermediate  echogenicity masslike lesion most likely representing a large  hemorrhagic cyst or endometrioma. Blood flow is demonstrated to the  left ovary.  2. Intrapelvic free fluid containing low level intrinsic echoes which  may be related to debris and/or blood product.      MACRO:  None.      Signed by: Jose Zambrano 2/12/2025 1:19 PM  Dictation workstation:   [id number]    Problem List Items Addressed This Visit    None  Visit Diagnoses       Left ovarian cyst    -  Primary    Relevant Orders    US PELVIS TRANSABDOMINAL WITH TRANSVAGINAL             Provider Impression:  1.  Left ovarian cyst  Personally reviewed the pelvic ultrasound which shows a 5.7 cm left ovarian cyst.  Uterus is normal in size and endometrial thickness is 6 mm.  Patient informed that hemorrhagic ovarian cyst typically will resolve in several months.  Patient was offered to be started on hormonal contraceptives to help reduce the recurrence ovarian cyst but she is undecided at this time.  Recommend follow-up ultrasound in 8 weeks regarding the ovarian cyst.  Patient to return in 8 weeks to review ultrasound and to perform annual exam and Pap.

## 2025-04-21 ENCOUNTER — HOSPITAL ENCOUNTER (OUTPATIENT)
Dept: RADIOLOGY | Facility: CLINIC | Age: 25
Discharge: HOME | End: 2025-04-21
Payer: COMMERCIAL

## 2025-04-21 DIAGNOSIS — N83.202 LEFT OVARIAN CYST: ICD-10-CM

## 2025-04-21 PROCEDURE — 76856 US EXAM PELVIC COMPLETE: CPT | Performed by: RADIOLOGY

## 2025-04-21 PROCEDURE — 76856 US EXAM PELVIC COMPLETE: CPT

## 2025-04-21 PROCEDURE — 76830 TRANSVAGINAL US NON-OB: CPT | Performed by: RADIOLOGY

## 2025-04-29 ENCOUNTER — APPOINTMENT (OUTPATIENT)
Facility: CLINIC | Age: 25
End: 2025-04-29
Payer: COMMERCIAL

## 2025-04-29 DIAGNOSIS — Z30.011 ENCOUNTER FOR INITIAL PRESCRIPTION OF CONTRACEPTIVE PILLS: ICD-10-CM

## 2025-04-29 DIAGNOSIS — N83.202 LEFT OVARIAN CYST: Primary | ICD-10-CM

## 2025-04-29 DIAGNOSIS — Z12.4 ENCOUNTER FOR SCREENING FOR CERVICAL CANCER: ICD-10-CM

## 2025-04-29 DIAGNOSIS — Z01.419 ENCOUNTER FOR ANNUAL ROUTINE GYNECOLOGICAL EXAMINATION: ICD-10-CM

## 2025-04-29 PROCEDURE — 1036F TOBACCO NON-USER: CPT | Performed by: OBSTETRICS & GYNECOLOGY

## 2025-04-29 PROCEDURE — 99395 PREV VISIT EST AGE 18-39: CPT | Performed by: OBSTETRICS & GYNECOLOGY

## 2025-04-29 PROCEDURE — 99459 PELVIC EXAMINATION: CPT | Performed by: OBSTETRICS & GYNECOLOGY

## 2025-04-29 RX ORDER — DROSPIRENONE AND ETHINYL ESTRADIOL 0.03MG-3MG
1 KIT ORAL DAILY
Qty: 28 TABLET | Refills: 11 | Status: SHIPPED | OUTPATIENT
Start: 2025-04-29 | End: 2026-04-29

## 2025-04-29 NOTE — PROGRESS NOTES
Mague Rodriguez is a 25 y.o. female who is here for a routine exam. PCP = Kelton Dillard MD    No chief complaint on file.       Presents for annual exam and to review follow-up pelvic ultrasound regarding a left ovarian cyst. She voices no complaints and is doing well. Denies any bowel or bladder problems. Denies any breast problems.  She wishes to be started on birth control pills.    OB History          0    Para   0    Term   0       0    AB   0    Living   0         SAB   0    IAB   0    Ectopic   0    Multiple   0    Live Births   0                  Social History     Substance and Sexual Activity   Sexual Activity Yes    Birth control/protection: None     Current contraception: None    Medical History[1]    Surgical History[2]    Past med hx and past surg hx reviewed and notable for: none    Review of Systems:   Constitutional: No fever or chills  Respiratory: No shortness of breath, or cough  Cardiovascular: No chest pain or syncope  Breasts: No breast pain, no masses, no nipple discharge  Gastrointestinal: No nausea, vomiting, or diarrhea, no abdominal pain  Genitourinary: No dysuria or frequency  Gynecology: Negative except as noted in history of present illness  All other: All other systems reviewed and negative for complaint    Objective   There were no vitals taken for this visit.    PHYSICAL EXAMINATION:  Chaperone present for exam:  Irma (Saundra) KEI Hinton  Well-developed, well nourished, in no acute distress, alert and oriented x three, is pleasant and cooperative.   HEENT: Clear. Pupils equal, round and reactive to light and accommodation. Extraocular muscles are intact. Oral mucosa pink without exudate.   NECK: No lymphadenopathy, no thyromegaly.  BREASTS: Symmetric, no palpable masses. No nipple discharge or retraction.  LUNGS: Clear bilaterally.  HEART: Regular rate and rhythm without murmurs.  ABDOMEN: Normoactive bowel sounds, soft and nontender, no guarding or rebound  tenderness, no CVA tenderness.  EXTREMITIES: No clubbing, cyanosis or edema.  NEUROLOGIC:  Cranial nerves II-XII grossly intact.  :  Normal external female genitalia, normal vulva, normal vagina. Normal urethral meatus, urethra and bladder. Normal appearing cervix. Normal-sized uterus, no adnexal masses or tenderness. Pap smear performed today.    Interpreted By:  Kit Miller,   STUDY:  US PELVIS TRANSABDOMINAL WITH TRANSVAGINAL;  4/21/2025 1:52 pm      INDICATION:  Signs/Symptoms:Left ovarian cyst.      ,N83.202 Unspecified ovarian cyst, left side      COMPARISON:  02/11/2025      ACCESSION NUMBER(S):  [id number]      ORDERING CLINICIAN:  KATE HAWKINS      TECHNIQUE:  Multiple multiplanar static gray scale, color and spectral waveform  sonographic images of the pelvis were obtained. Transabdominal and  endovaginal ultrasound was performed.      FINDINGS:  UTERUS:  The uterus measures  5.0 cm x 3.7 cm x 8.3 cm.      ENDOMETRIUM:  The endometrium measures a thickness of  11 mm.      RIGHT ADNEXA:  The right ovary measures 2.0 cm x 2.4 cm x 3.3 cm and demonstrates  normal flow. No gross right adnexal masses are seen, no hydrosalpinx.      LEFT ADNEXA:  The left ovary measures 6.3 cm x 4.8 cm x 6.9 cm and demonstrates  normal flow. A mass is seen in the left adnexal region with uniform  low-grade echoes within it, measuring 5.7 x 4 x 5.6 cm.      CUL DE SAC:  A small amount of free fluid is present.      IMPRESSION:  1. Mass left adnexal region with uniform low-grade echoes. This may  be a hemorrhagic cyst or an endometrioma. No significant change from  02/11/2025. Suggest follow-up with MRI.  2. Small amount of free pelvic fluid      MACRO:  None      Signed by: Kit Miller 4/21/2025 3:58 PM  Dictation workstation:   [id number]         Actions performed during this visit include:  - Clinical breast exam  - Clinical pelvic exam  -   Orders Placed This Encounter   Procedures    MR pelvis w and wo IV  contrast     Standing Status:   Future     Expected Date:   5/29/2025     Expiration Date:   4/29/2026     Reason for exam::   left ovarian cyst     May utilize  for port access if port present for this exam.:   Yes     Does the patient have a Cochlear Implant, Brain Aneurysm Clip, Implanted Nerve or Bone Graft Stimulator, Implanted Breast Tissue Expander, Glucose Monitor or Neulasta Device?:   No     Does the patient have a Pacemaker, Defibrillator, or cardiac leads abandoned or otherwise? (Please specify if the components are part of a temporary/permanent system).:   No     Radiologist to Determine Optimal Study:   Yes     Release result to [employer]:   Immediate [1]     Is this exam part of a Research Study? If Yes, link this order to the research study:   No        Problem List Items Addressed This Visit    None  Visit Diagnoses         Left ovarian cyst    -  Primary    Relevant Orders    MR pelvis w and wo IV contrast      Encounter for annual routine gynecological examination        Relevant Orders    THINPREP PAP TEST      Encounter for screening for cervical cancer        Relevant Orders    THINPREP PAP TEST      Encounter for initial prescription of contraceptive pills        Relevant Medications    drospirenone-ethinyl estradioL (Shereen, 28,) 3-0.03 mg tablet             Provider Impression:  1.  Annual  2.  Contraceptive counseling   3.  Left ovarian cyst  Personally reviewed the follow-up pelvic ultrasound showing a 5.7 x 4 x 5.6 cm left ovarian cyst with uniform low-grade echoes within the cyst.  The cyst has essentially stayed the same size.  Radiologist recommends follow-up MRI as this cyst could either be a hemorrhagic cyst or an endometrioma.  Patient desires to be started on birth control pills and a prescription for Shereen will be sent to the pharmacy.    Thank you for coming to your annual exam. Your findings during the exam were normal.  Please return for your next visit in 1 month to  review pelvic MRI.       [1]   Past Medical History:  Diagnosis Date    Other conditions influencing health status     Menstruation   [2]   Past Surgical History:  Procedure Laterality Date    BREAST BIOPSY Left 01/23/2020    TYMPANOSTOMY TUBE PLACEMENT

## 2025-05-05 ENCOUNTER — APPOINTMENT (OUTPATIENT)
Age: 25
End: 2025-05-05
Payer: COMMERCIAL

## 2025-05-05 VITALS
OXYGEN SATURATION: 98 % | HEART RATE: 101 BPM | BODY MASS INDEX: 17.33 KG/M2 | DIASTOLIC BLOOD PRESSURE: 70 MMHG | SYSTOLIC BLOOD PRESSURE: 98 MMHG | HEIGHT: 67 IN | WEIGHT: 110.4 LBS

## 2025-05-05 DIAGNOSIS — G44.89 OTHER HEADACHE SYNDROME: Primary | ICD-10-CM

## 2025-05-05 PROCEDURE — 99213 OFFICE O/P EST LOW 20 MIN: CPT | Performed by: FAMILY MEDICINE

## 2025-05-05 PROCEDURE — 1036F TOBACCO NON-USER: CPT | Performed by: FAMILY MEDICINE

## 2025-05-05 PROCEDURE — 3008F BODY MASS INDEX DOCD: CPT | Performed by: FAMILY MEDICINE

## 2025-05-05 ASSESSMENT — ENCOUNTER SYMPTOMS
HEADACHES: 1
DYSPHORIC MOOD: 0
FATIGUE: 1
SLEEP DISTURBANCE: 0
NERVOUS/ANXIOUS: 1
DECREASED CONCENTRATION: 0

## 2025-05-05 ASSESSMENT — PATIENT HEALTH QUESTIONNAIRE - PHQ9
1. LITTLE INTEREST OR PLEASURE IN DOING THINGS: NOT AT ALL
SUM OF ALL RESPONSES TO PHQ9 QUESTIONS 1 AND 2: 0
2. FEELING DOWN, DEPRESSED OR HOPELESS: NOT AT ALL

## 2025-05-05 NOTE — PROGRESS NOTES
"Subjective   Patient ID: Mague Rodriguez is a 25 y.o. female who presents for Follow-up (3mo chk- stopped prozac(a month or so)).    HPI   Basic labs were okay from February.  Unfortunately had some significant sexual side effects from the Prozac, so made her very edgy and angry.  Had been on in the past without troubles, stopped and feels much better.  Takes her about the same.    At this point if we do another medication, I would do 25 mg of sertraline or 10 mg of citalopram.  She will call in the future if she wants to get started on this  Right now she just started the asthma and and we will see how that goes.  So she might end up with an MRI and possible surgery looking for endometriosis with her GYN.  Obviously both the hormones and the stress could be driving her headaches.      Review of Systems   Constitutional:  Positive for fatigue.   Neurological:  Positive for headaches.   Psychiatric/Behavioral:  Negative for decreased concentration, dysphoric mood and sleep disturbance. The patient is nervous/anxious.        Objective   BP 98/70   Pulse 101   Ht 1.702 m (5' 7\")   Wt 50.1 kg (110 lb 6.4 oz)   SpO2 98%   BMI 17.29 kg/m²     Physical Exam  Constitutional:       Appearance: Normal appearance.   HENT:      Head: Normocephalic and atraumatic.   Skin:     General: Skin is warm and dry.   Neurological:      General: No focal deficit present.      Mental Status: She is alert and oriented to person, place, and time.   Psychiatric:         Mood and Affect: Mood normal.         Behavior: Behavior normal.         Thought Content: Thought content normal.         Judgment: Judgment normal.         Assessment/Plan   Problem List Items Addressed This Visit           ICD-10-CM    Other headache syndrome - Primary G44.89          "

## 2025-05-08 ENCOUNTER — TELEPHONE (OUTPATIENT)
Facility: CLINIC | Age: 25
End: 2025-05-08
Payer: COMMERCIAL

## 2025-05-08 NOTE — TELEPHONE ENCOUNTER
Patient called and stated she is scheduled for an MRI on May 30th and she thought you told her that she needed to have labs drawn prior to the MRI. I did not see any orders, can you clarify when you come back to the office. No rush. Patient is aware you are out of the office until 5/16.

## 2025-05-21 DIAGNOSIS — B37.31 VAGINAL YEAST INFECTION: Primary | ICD-10-CM

## 2025-05-21 LAB
CYTOLOGY CMNT CVX/VAG CYTO-IMP: NORMAL
HPV HR 12 DNA GENITAL QL NAA+PROBE: POSITIVE
HPV HR GENOTYPES PNL CVX NAA+PROBE: POSITIVE
HPV16 DNA SPEC QL NAA+PROBE: NEGATIVE
HPV18 DNA SPEC QL NAA+PROBE: NEGATIVE
LAB AP HPV GENOTYPE QUESTION: YES
LAB AP HPV HR: NORMAL
LABORATORY COMMENT REPORT: NORMAL
LMP START DATE: NORMAL
PATH REPORT.TOTAL CANCER: NORMAL
RESIDENT REVIEW: NORMAL

## 2025-05-21 RX ORDER — FLUCONAZOLE 150 MG/1
150 TABLET ORAL ONCE
Qty: 1 TABLET | Refills: 0 | Status: SHIPPED | OUTPATIENT
Start: 2025-05-21 | End: 2025-05-21

## 2025-05-21 NOTE — TELEPHONE ENCOUNTER
----- Message from Sriram Donovan sent at 5/21/2025  2:44 PM EDT -----  Pap shows ASCUS with positive high-risk HPV.  Recommend colposcopy.  Noted yeast which can be treated if she has symptoms.  Please inform patient and schedule colposcopy.  ----- Message -----  From: Lab, Background User  Sent: 5/21/2025   2:17 PM EDT  To: Sriram Donovan MD

## 2025-05-30 ENCOUNTER — HOSPITAL ENCOUNTER (OUTPATIENT)
Dept: RADIOLOGY | Facility: HOSPITAL | Age: 25
Discharge: HOME | End: 2025-05-30
Payer: COMMERCIAL

## 2025-05-30 VITALS — BODY MASS INDEX: 17.26 KG/M2 | WEIGHT: 110.23 LBS

## 2025-05-30 DIAGNOSIS — N83.202 LEFT OVARIAN CYST: ICD-10-CM

## 2025-05-30 PROCEDURE — A9575 INJ GADOTERATE MEGLUMI 0.1ML: HCPCS | Mod: JZ | Performed by: OBSTETRICS & GYNECOLOGY

## 2025-05-30 PROCEDURE — 72197 MRI PELVIS W/O & W/DYE: CPT

## 2025-05-30 PROCEDURE — 2550000001 HC RX 255 CONTRASTS: Mod: JZ | Performed by: OBSTETRICS & GYNECOLOGY

## 2025-05-30 RX ORDER — GADOTERATE MEGLUMINE 376.9 MG/ML
10 INJECTION INTRAVENOUS
Status: COMPLETED | OUTPATIENT
Start: 2025-05-30 | End: 2025-05-30

## 2025-05-30 RX ADMIN — GADOTERATE MEGLUMINE 10 ML: 376.9 INJECTION INTRAVENOUS at 10:09

## 2025-06-04 ENCOUNTER — TELEPHONE (OUTPATIENT)
Facility: CLINIC | Age: 25
End: 2025-06-04
Payer: COMMERCIAL

## 2025-06-04 NOTE — TELEPHONE ENCOUNTER
Patient called and left a message stating she got her MRI results and would like to go over them with you. Patient is scheduled to see you on 6/10. Please send reply to Saundra. Thank you.

## 2025-06-10 ENCOUNTER — APPOINTMENT (OUTPATIENT)
Facility: CLINIC | Age: 25
End: 2025-06-10
Payer: COMMERCIAL

## 2025-06-10 VITALS
SYSTOLIC BLOOD PRESSURE: 118 MMHG | BODY MASS INDEX: 17.08 KG/M2 | HEIGHT: 67 IN | DIASTOLIC BLOOD PRESSURE: 78 MMHG | WEIGHT: 108.8 LBS

## 2025-06-10 DIAGNOSIS — R87.610 ASCUS WITH POSITIVE HIGH RISK HPV CERVICAL: Primary | ICD-10-CM

## 2025-06-10 DIAGNOSIS — N80.122: ICD-10-CM

## 2025-06-10 DIAGNOSIS — R87.810 ASCUS WITH POSITIVE HIGH RISK HPV CERVICAL: Primary | ICD-10-CM

## 2025-06-10 PROCEDURE — 57452 EXAM OF CERVIX W/SCOPE: CPT | Performed by: OBSTETRICS & GYNECOLOGY

## 2025-06-10 ASSESSMENT — ANXIETY QUESTIONNAIRES
IF YOU CHECKED OFF ANY PROBLEMS ON THIS QUESTIONNAIRE, HOW DIFFICULT HAVE THESE PROBLEMS MADE IT FOR YOU TO DO YOUR WORK, TAKE CARE OF THINGS AT HOME, OR GET ALONG WITH OTHER PEOPLE: NOT DIFFICULT AT ALL
6. BECOMING EASILY ANNOYED OR IRRITABLE: NOT AT ALL
1. FEELING NERVOUS, ANXIOUS, OR ON EDGE: NOT AT ALL
2. NOT BEING ABLE TO STOP OR CONTROL WORRYING: NOT AT ALL
GAD7 TOTAL SCORE: 0
5. BEING SO RESTLESS THAT IT IS HARD TO SIT STILL: NOT AT ALL
4. TROUBLE RELAXING: NOT AT ALL
7. FEELING AFRAID AS IF SOMETHING AWFUL MIGHT HAPPEN: NOT AT ALL
3. WORRYING TOO MUCH ABOUT DIFFERENT THINGS: NOT AT ALL

## 2025-06-10 ASSESSMENT — COLUMBIA-SUICIDE SEVERITY RATING SCALE - C-SSRS
2. HAVE YOU ACTUALLY HAD ANY THOUGHTS OF KILLING YOURSELF?: NO
1. IN THE PAST MONTH, HAVE YOU WISHED YOU WERE DEAD OR WISHED YOU COULD GO TO SLEEP AND NOT WAKE UP?: NO
6. HAVE YOU EVER DONE ANYTHING, STARTED TO DO ANYTHING, OR PREPARED TO DO ANYTHING TO END YOUR LIFE?: NO

## 2025-06-10 NOTE — PROGRESS NOTES
"Patient ID: Mague Rodriguez is a 25 y.o. female.  Also to review recent pelvic MRI regarding left ovarian cyst.  Chief Complaint   Patient presents with    Procedure     Pt here for colp   ASUCS/HPV+. LMP-06/03/2025. Pt has no concerns        /78   Ht 1.702 m (5' 7\")   Wt 49.4 kg (108 lb 12.8 oz)   LMP 06/03/2025 (Exact Date)   BMI 17.04 kg/m²      Colposcopy    Date/Time: 6/10/2025 11:25 AM    Performed by: Kate Donovan MD  Authorized by: Kate Donovan MD    Procedure location: cervix    Consent:     Patient questions answered: yes      Consent obtained:  Verbal    Consent given by:  Patient  Indication:     Cervical indication(s): high-risk HPV positive and ASCUS    Pre-procedure:     Speculum was placed in the vagina: yes      Prep solution(s): acetic acid    Procedure:     Colposcopy with: colposcopy only      Biopsy taken: no      Cervix visibility: fully visualized      SCJ visibility: fully visualized      Cervical impression: normal/benign    Post-procedure:     Patient tolerance of procedure:  Patient tolerated the procedure well with no immediate complications  Comments:      Recommend close surveillance with repeat Pap smears every 6 months.  Reviewed the recent pelvic MRI showing a probable left ovarian endometrioma.  Recommend referring to reproductive endocrinologist for treatment.  Patient was recently started on birth control pills.      Chaperone present for exam: Irma Hinton MA ([place])  OBGyemily Exam         Interpreted By:  Trey Morrissey,   STUDY:  MR PELVIS W AND WO IV CONTRAST;  5/30/2025 10:23 am      INDICATION:  Signs/Symptoms:left ovarian cyst.      ,N83.202 Unspecified ovarian cyst, left side      COMPARISON:  CT 02/10/2025      ACCESSION NUMBER(S):  [id number]      ORDERING CLINICIAN:  KATE DONOVAN      TECHNIQUE:  Multiplanar MRI of the pelvis was obtained including axial, sagittal  and coronal T2 weighted SSFSE, (para)axial, (para)coronal and  sagittal T2 FSE , axial " DWI, pre and post gadolinium dynamic T1 GRE  sequences in 3 planes.  10 ML of Dotarem was administered intravenously without complication.      FINDINGS:  UTERUS:  The uterus is anteverted and measures 7.4 x 4.3 x 5.5 cm.   The  thickness of the junctional zone is within normal limits.   The  endometrium has normal thickness and appearance.  No uterine masses  are identified.  The vagina and vulva are unremarkable.      OVARIES/ADNEXA:      RIGHT:  The right ovary is normal in size and appears unremarkable.   There  is no cystic or solid mass or endometriosis. Small follicles are  noted. There is  no hydrosalpinx.      LEFT:  The left ovary is enlarged relating to a 7.0 x 5.5 cm T1  hyperintense, T2 hypointense nonenhancing lesion which is not  significantly changed compared to CT 3.7 months ago. There is no  cystic or solid mass or endometriosis. There is  no hydrosalpinx.      PERITONEAL FLUID:  Trace pelvic free fluid likely physiologic.      PELVIC LYMPH NODES:  No abnormally enlarged pelvic lymph nodes are identified.      BOWEL:  No dilated bowel is visualized.      BONES:  No focal lesions are noted in the bone. The visualized major pelvic  vasculature is patent.      IMPRESSION:  1.  7 cm left ovarian lesion with imaging features of endometrioma,  not significantly changed in size compared to CT nearly 4 months ago.  2.  Unremarkable right ovary.  3. Unremarkable uterus.  4. Trace pelvic free fluid likely physiologic.          MACRO:  None      Signed by: Trey Morrissey 6/2/2025 1:58 PM  Dictation workstation:   [id number]     Problem List Items Addressed This Visit    None  Visit Diagnoses         ASCUS with positive high risk HPV cervical    -  Primary      Endometrioma of left ovary        Relevant Orders    Referral to Reproductive Endocrinology

## 2025-06-11 NOTE — TELEPHONE ENCOUNTER
Pts mother called regarding her referral to [facility]. Pt did schedule with them but she is not happy about it being five months out. Is there another place we could send her?    Thank you

## 2025-06-23 ENCOUNTER — APPOINTMENT (OUTPATIENT)
Facility: CLINIC | Age: 25
End: 2025-06-23
Payer: COMMERCIAL

## 2025-07-17 ENCOUNTER — APPOINTMENT (OUTPATIENT)
Dept: OBSTETRICS AND GYNECOLOGY | Facility: CLINIC | Age: 25
End: 2025-07-17
Payer: COMMERCIAL

## 2025-07-24 ENCOUNTER — CONSULT (OUTPATIENT)
Dept: ENDOCRINOLOGY | Facility: CLINIC | Age: 25
End: 2025-07-24
Payer: COMMERCIAL

## 2025-07-24 VITALS
TEMPERATURE: 97.8 F | HEIGHT: 67 IN | RESPIRATION RATE: 20 BRPM | OXYGEN SATURATION: 100 % | SYSTOLIC BLOOD PRESSURE: 111 MMHG | HEART RATE: 85 BPM | WEIGHT: 109.8 LBS | DIASTOLIC BLOOD PRESSURE: 73 MMHG | BODY MASS INDEX: 17.23 KG/M2

## 2025-07-24 DIAGNOSIS — N80.129 ENDOMETRIOMA: Primary | ICD-10-CM

## 2025-07-24 DIAGNOSIS — N80.9 ENDOMETRIOSIS: ICD-10-CM

## 2025-07-24 DIAGNOSIS — R10.2 CHRONIC PELVIC PAIN IN FEMALE: ICD-10-CM

## 2025-07-24 DIAGNOSIS — G89.29 CHRONIC PELVIC PAIN IN FEMALE: ICD-10-CM

## 2025-07-24 PROCEDURE — 99204 OFFICE O/P NEW MOD 45 MIN: CPT | Performed by: OBSTETRICS & GYNECOLOGY

## 2025-07-24 PROCEDURE — 99214 OFFICE O/P EST MOD 30 MIN: CPT | Performed by: OBSTETRICS & GYNECOLOGY

## 2025-07-24 ASSESSMENT — PATIENT HEALTH QUESTIONNAIRE - PHQ9
SUM OF ALL RESPONSES TO PHQ9 QUESTIONS 1 AND 2: 0
1. LITTLE INTEREST OR PLEASURE IN DOING THINGS: NOT AT ALL
2. FEELING DOWN, DEPRESSED OR HOPELESS: NOT AT ALL

## 2025-07-24 ASSESSMENT — LIFESTYLE VARIABLES
TOBACCO_USE: NO
HISTORY_ALCOHOL_USE: NO

## 2025-07-24 ASSESSMENT — COLUMBIA-SUICIDE SEVERITY RATING SCALE - C-SSRS
2. HAVE YOU ACTUALLY HAD ANY THOUGHTS OF KILLING YOURSELF?: NO
6. HAVE YOU EVER DONE ANYTHING, STARTED TO DO ANYTHING, OR PREPARED TO DO ANYTHING TO END YOUR LIFE?: NO
1. IN THE PAST MONTH, HAVE YOU WISHED YOU WERE DEAD OR WISHED YOU COULD GO TO SLEEP AND NOT WAKE UP?: NO

## 2025-07-24 ASSESSMENT — PAIN SCALES - GENERAL: PAINLEVEL_OUTOF10: 0-NO PAIN

## 2025-07-24 NOTE — PROGRESS NOTES
Visit Type: In Person  MD reviewed, No authorization to share with partner.    NEW FERTILITY PATIENT VISIT    Referred by: Dr. Donovan    Accompanied today by: Mother       Mague Rodriguez is a 25 y.o.  female who presents with   Enlarged cyst on left ovary, endomitriosis  She just started on Shereen about 2-3 months ago. She has had heavy painful periods since her teens.   She has a hx of kidney stones and pelvic pain. Was referred by OBGYN for surgery. Not wanting to get pregnant. Here for symptoms and surgical management.     She is taking Shereen but not continuously.     Has had US and MRI performed which demonstrated 7cm endometrioma. Painful intercourse. Diarrhea/constipation. No urinary issues.     What are you goals for today's visit? Remove cyst and treat endometriosis    OB Hx     OB History          0    Para   0    Term   0       0    AB   0    Living   0         SAB   0    IAB   0    Ectopic   0    Multiple   0    Live Births   0                 GYN HISTORY   Have you ever been diagnosed with a sexually transmitted disease? No    Please select all that are applicable:    Have you ever had Pelvic Inflammatory Disease? No    Have you had an abnormal PAP smear? Yes    Date & Result of last PAP smear:   Lab Results   Component Value Date    FINALINTERP  2025     Squamous and/or Glandular Abnormality    A. THINPREP PAP CERVIX, SCREENING -     Specimen Adequacy  Satisfactory for evaluation; absence of endocervical/transformation zone component    General Categorization  Epithelial cell abnormality- squamous cell, see interpretation.    Descriptive Interpretation  Atypical squamous cells of undetermined significance (ASC-US) - Cervix  Fungal organisms morphologically consistent with Candida spp., Cellular evidence of parakeratosis      An HPV-including Genotype test is performed (per requisition) by the [facility] at [facility].         "  Have you ever had an abnormal Mammogram? No    Date & result of your last mammogram:    Do you have pelvic pain? Yes  Do you have pain with intercourse? Yes    Do you use lubricants with intercourse? N/A    Do you have pain with bowel movements? Yes              Do you have pain with a full bladder? No    MENSTRUAL HISTORY  LMP: Other    Menarche: 12 years old    Contraception: Yary    Cycle length: 21    Describe your bleeding: Average    Dysmenorrhea: Yes       ENDOCRINE/INFERTILITY HISTORY  Duration of infertility: Not applicable    Coital Activity/week: Not trying to get pregnant    Nipple Discharge: No    Vision changes: No    Headaches: Yes    Excess hair growth: No    Excessive hair loss: No    Acne: No    Oily skin: Yes    Recent weight change  Weight gain: No    Weight loss: Yes    Exercise more than 3 times a week: No      PMH  Medical History[1]     MEDICATIONS  Medications Ordered Prior to Encounter[2]     PSH  Surgical History[3]        SOCIAL HISTORY  Social History[4]  Occupation:     Have you ever been incarcerated? No    Do you have a history of domestic violence? No    Do you feel safe at home? Yes    Do you have a history of any negative sexual experience such as incest or rape?     FAMILY VTE HISTORY  Family History of Blood Clots: No     BMI:   BMI Readings from Last 1 Encounters:   25 17.20 kg/m²     VITALS:  /73   Pulse 85   Temp 36.6 °C (97.8 °F) (Temporal)   Resp 20   Ht 1.702 m (5' 7\")   Wt 49.8 kg (109 lb 12.8 oz)   LMP 2025   SpO2 100%   BMI 17.20 kg/m²     Exam deferred    ASSESSMENT   25 y.o.  female referred for surgery for known and symptomatic 7cm left sided ovarian cyst (suspected endometrioma) as well as longstanding pelvic symptoms likely related to stage 4 endometriosis    PLAN  Reviewed natural history of endometriosis, likely stage and recurrence risks and relevant imaging  Reviewed surgical procedure and will place orders " today- will consider IUD for induction of amenorrhea versus initiate long term hormone treatment for endometriosis starting after surgery   RBA of surgery discussed including risk of anesthesia, bleeding/blood transfusion, infection and damage to nearby organs    Intimate Exam Performed: No, an intimate exam was not performed at this encounter.     Janee SIERRA Sonny  07/24/2025  10:16 AM         [1]   Past Medical History:  Diagnosis Date    Abnormal Pap smear of cervix     Depression     HPV (human papilloma virus) infection     Kidney stone     Migraine     Other conditions influencing health status     Menstruation   [2]   Current Outpatient Medications on File Prior to Visit   Medication Sig Dispense Refill    drospirenone-ethinyl estradioL (Shereen, 28,) 3-0.03 mg tablet Take 1 tablet by mouth once daily. 28 tablet 11    naproxen (Naprosyn) 250 mg tablet Take 1 tablet (250 mg) by mouth if needed for mild pain (1 - 3) (for headaches).       No current facility-administered medications on file prior to visit.   [3]   Past Surgical History:  Procedure Laterality Date    BREAST BIOPSY Left 01/23/2020    TYMPANOSTOMY TUBE PLACEMENT     [4]   Social History  Tobacco Use    Smoking status: Never    Smokeless tobacco: Never    Tobacco comments:     Patient vapes 7/24/25   Vaping Use    Vaping status: Every Day   Substance Use Topics    Alcohol use: Yes    Drug use: Never      Medical Necessity Information: It is in your best interest to select a reason for this procedure from the list below. All of these items fulfill various CMS LCD requirements except the new and changing color options. Medical Necessity Clause: This procedure was medically necessary because the lesion that was treated was: Lab: -[id number] Lab Facility: 0 Detail Level: Detailed Was A Bandage Applied: Yes Size Of Lesion In Cm (Required): 0.5 Biopsy Method: Dermablade Anesthesia Type: 1% lidocaine with epinephrine Hemostasis: Drysol Wound Care: Petrolatum Render Path Notes In Note?: No Consent was obtained from the patient. The risks and benefits to therapy were discussed in detail. Specifically, the risks of infection, scarring, bleeding, prolonged wound healing, incomplete removal, allergy to anesthesia, nerve injury and recurrence were addressed. Prior to the procedure, the treatment site was clearly identified and confirmed by the patient. All components of Universal Protocol/PAUSE Rule completed. Post-Care Instructions: I reviewed with the patient in detail post-care instructions. Patient is to keep the biopsy site dry overnight, and then apply bacitracin twice daily until healed. Patient may apply hydrogen peroxide soaks to remove any crusting. Notification Instructions: Patient will be notified of pathology results. However, patient instructed to call the office if not contacted within 2 weeks. Billing Type: Third-Party Bill

## 2025-07-28 ENCOUNTER — PREP FOR PROCEDURE (OUTPATIENT)
Dept: ENDOCRINOLOGY | Facility: CLINIC | Age: 25
End: 2025-07-28
Payer: COMMERCIAL

## 2025-07-28 DIAGNOSIS — R10.2 CHRONIC PELVIC PAIN IN FEMALE: Primary | ICD-10-CM

## 2025-07-28 DIAGNOSIS — Z01.818 PRE-PROCEDURAL EXAMINATION: ICD-10-CM

## 2025-07-28 DIAGNOSIS — N83.209 CYST OF OVARY, UNSPECIFIED LATERALITY: ICD-10-CM

## 2025-07-28 DIAGNOSIS — G89.29 CHRONIC PELVIC PAIN IN FEMALE: Primary | ICD-10-CM

## 2025-07-28 DIAGNOSIS — N93.9 ABNORMAL UTERINE BLEEDING (AUB): ICD-10-CM

## 2025-07-28 RX ORDER — CELECOXIB 400 MG/1
400 CAPSULE ORAL ONCE
OUTPATIENT
Start: 2025-07-28 | End: 2025-07-28

## 2025-07-28 RX ORDER — GABAPENTIN 600 MG/1
600 TABLET ORAL ONCE
OUTPATIENT
Start: 2025-07-28 | End: 2025-07-28

## 2025-07-28 RX ORDER — ACETAMINOPHEN 325 MG/1
975 TABLET ORAL ONCE
OUTPATIENT
Start: 2025-07-28 | End: 2025-07-28

## 2025-07-29 ENCOUNTER — TELEPHONE (OUTPATIENT)
Dept: OBSTETRICS AND GYNECOLOGY | Facility: CLINIC | Age: 25
End: 2025-07-29
Payer: COMMERCIAL

## 2025-07-29 PROBLEM — N93.9 ABNORMAL UTERINE BLEEDING (AUB): Status: ACTIVE | Noted: 2025-07-28

## 2025-07-29 PROBLEM — R10.2 CHRONIC PELVIC PAIN IN FEMALE: Status: ACTIVE | Noted: 2025-07-28

## 2025-07-29 PROBLEM — N83.209 OVARIAN RETENTION CYST: Status: ACTIVE | Noted: 2025-07-28

## 2025-07-29 PROBLEM — G89.29 CHRONIC PELVIC PAIN IN FEMALE: Status: ACTIVE | Noted: 2025-07-28

## 2025-08-13 ENCOUNTER — APPOINTMENT (OUTPATIENT)
Dept: ENDOCRINOLOGY | Facility: CLINIC | Age: 25
End: 2025-08-13
Payer: COMMERCIAL

## 2025-11-25 ENCOUNTER — APPOINTMENT (OUTPATIENT)
Dept: ENDOCRINOLOGY | Facility: CLINIC | Age: 25
End: 2025-11-25
Payer: COMMERCIAL

## 2025-12-29 ENCOUNTER — APPOINTMENT (OUTPATIENT)
Facility: CLINIC | Age: 25
End: 2025-12-29
Payer: COMMERCIAL